# Patient Record
(demographics unavailable — no encounter records)

---

## 2024-12-02 NOTE — REASON FOR VISIT
[Home] : at home, [unfilled] , at the time of the visit. [Medical Office: ([facility])___] : at the medical office located in  [Family Member] : family member [Follow-Up] : a follow-up visit [FreeTextEntry3] : Daughter

## 2024-12-02 NOTE — REVIEW OF SYSTEMS
[TextEntry] : Review of Systems: general: No weight loss, No recent fever, chills HEENT: no headache, no change in vision or hearing Pulmonary: No SOB, or cough CVS: No chest pain, [place], or change in exercise tolerance Gastrointestinal: No Nausea/vomiting/constipation/diarrhea : No complaint of dysuria or urinary frequency, no excess foaming Skin: Denies no new rash, lesions, ulcer [place] skeletal: No edema, cyanosis, or new ulcers Neurological: No headache, dizziness, vertigo

## 2024-12-02 NOTE — RESULTS/DATA
[TextEntry] : Labs: 9.13.24 144/5.5/107/20/19/1.65/gfr41/ca9.9   05/22/24 144/4.9/104/28/121/1.58/egfr43 upcr 0.4 cbc 7.7/15.9/147  02/21/24 143/4.8/101/30/24/2.04 egfr 32 ca 9.5 UA protein 30 upcr 0.3  1/25/24 140/3.8/102/30/49/2.1 egfr 30.7 ca 9.7   11/7/23  cr2.35  11/3/23 141/4.7/93/34/71/2.38 egfr 26 ca 10.4  10/27/2023: Sodium 141, potassium 3.8, chloride 93, bicarb 36, BUN 70, creatinine 2.45 EGFR 25, calcium 10.2  10/16/2023 UA is negative for RBC negative for poor protein At that time creatinine level was 2.54  9/15/23 BUN/Cr 25/1.78 eGFR 37 K 5.1 HCO3 24 MRI with contrast   8/23/23: Left upper pole 7.6 x 6.8 cm exophytic cyst reveals various degrees of T1 hyperintensity and T2 hypointensity representing various stages of blood product, without internal enhancement to suggest active hemorrhage or extravasation based on subtracted postcontrast imaging, and no neural nodule or soft tissue component. Rest of multiple small simple cysts bilaterally, mostly sub-cm, several 1-2 cm. No suspicious or enhancing lesion. No hydronephrosis or hydroureter.      Labs 3/10/22  BUN/Cr 36/1.97 eGFR 31 K 5.2 HCO3 24

## 2024-12-02 NOTE — ASSESSMENT
[FreeTextEntry1] : Assessment: Hyperkalemia- due to CKD and dietary indiscretion  CKD stage 3B- Renal function unchanged HTN- Recently losartan was increased to 50 mg 2x a day by cardio, BP is elevated

## 2024-12-02 NOTE — HISTORY OF PRESENT ILLNESS
[FreeTextEntry1] : 12.2.24 Telehealth  Reason for visit: Time spent 15 min on the phone ,documentation and prescription CKD stage 3B Hyperkalemia HTN  PMH CKD stage 3B Hyperkalemia DM HTN CHF s/p AICD and pacemaker CAD  PSH AICD Pacemaker   Initial eval 4.1.2022 Past medical history of DM2, HTN, CHF s/p AICD and pacemaker, CAD was referred for evaluation of CKD.

## 2024-12-02 NOTE — PLAN
[TextEntry] : Plan: Hyperkalemia-Decrease Losartan 50 mg 1x a day CKD stage 3B- Losartan, Torsemide 20 mg changed to daily HTN- Increase amlodipine 10 mg daily and continue Losartan 50 mg Maintain physical activity  Low potassium diet and Low salt diet Follow up with recommended workups in 2 weeks

## 2025-04-23 NOTE — HISTORY OF PRESENT ILLNESS
[FreeTextEntry1] : 04/23/25 Telehealth  Time spent  Reason for visit Follow up for  CKD stage 3B Hyperkalemia HTN   12.2.24 Telehealth  Reason for visit: Time spent 15 min on the phone ,documentation and prescription CKD stage 3B Hyperkalemia HTN  PMH CKD stage 3B Hyperkalemia DM HTN CHF s/p AICD and pacemaker CAD  PSH AICD Pacemaker   Initial eval 4.1.2022 Past medical history of DM2, HTN, CHF s/p AICD and pacemaker, CAD was referred for evaluation of CKD.

## 2025-04-23 NOTE — PLAN
[TextEntry] : Plan: Hyperkalemia-Continue Losartan 50 mg 1x a day, Low K diet CKD stage 3B- Continue Losartan 50 mg, Torsemide 20 mg every other day, Increase Farxiga to 10 mg daily  HTN- Continue amlodipine 10 mg daily and continue Losartan 50 mg Proteinuria:  Increase Farxiga to 10 mg daily, Continue Losartan 50 mg daily, Low protein diet  Maintain physical activity  Follow up with recommended workups in 6-8 weeks

## 2025-04-23 NOTE — RESULTS/DATA
[TextEntry] : Labs: 4/18/25 142/5.3/106/25/17/1.53/gfr44/ca9.5 UPCR 0.8 HGB 17.1  9.13.24 144/5.5/107/20/19/1.65/gfr41/ca9.9   05/22/24 144/4.9/104/28/121/1.58/egfr43 upcr 0.4 cbc 7.7/15.9/147  02/21/24 143/4.8/101/30/24/2.04 egfr 32 ca 9.5 UA protein 30 upcr 0.3  1/25/24 140/3.8/102/30/49/2.1 egfr 30.7 ca 9.7   11/7/23  cr2.35  11/3/23 141/4.7/93/34/71/2.38 egfr 26 ca 10.4  10/27/2023: Sodium 141, potassium 3.8, chloride 93, bicarb 36, BUN 70, creatinine 2.45 EGFR 25, calcium 10.2  10/16/2023 UA is negative for RBC negative for poor protein At that time creatinine level was 2.54  9/15/23 BUN/Cr 25/1.78 eGFR 37 K 5.1 HCO3 24 MRI with contrast   8/23/23: Left upper pole 7.6 x 6.8 cm exophytic cyst reveals various degrees of T1 hyperintensity and T2 hypointensity representing various stages of blood product, without internal enhancement to suggest active hemorrhage or extravasation based on subtracted postcontrast imaging, and no neural nodule or soft tissue component. Rest of multiple small simple cysts bilaterally, mostly sub-cm, several 1-2 cm. No suspicious or enhancing lesion. No hydronephrosis or hydroureter.      Labs 3/10/22  BUN/Cr 36/1.97 eGFR 31 K 5.2 HCO3 24

## 2025-04-23 NOTE — REASON FOR VISIT
[Home] : at home, [unfilled] , at the time of the visit. [Medical Office: ([facility])___] : at the medical office located in  [Telephone (audio)] : This telephonic visit was provided via audio only technology. [Technical] : patient unable to effectively utilize tele-video due to technical issues. [Follow-Up] : a follow-up visit [Family Member] : family member

## 2025-04-23 NOTE — ASSESSMENT
[FreeTextEntry1] : Assessment  CKD stage 3B; Renal function slightly improving Proteinuria, slightly worsened Hyperkalemia, slightly improved

## 2025-06-13 NOTE — PHYSICAL EXAM
[TextEntry] : Physical Examination: General: Not in acute distress Head: Normocephalic, no lesions Eyes:EOMI, Fundi normal Throat: Clear, no exudates or lesions Chest: Lungs clear, no rales, no rhonchi, no wheezes CVS: S1/S2, RR, no murmurs, no rubs Abdomen: Soft NT/ND, +BS Extremities: 1+ edema  Neurological: Alert, awake, no gross focal deficits

## 2025-06-13 NOTE — REVIEW OF SYSTEMS
[TextEntry] : Review of Systems: general: No weight loss, No recent fever, chills HEENT: no headache, no change in vision or hearing Pulmonary: No SOB, or cough CVS: No chest pain, [place], or change in exercise tolerance Gastrointestinal: No Nausea/vomiting/constipation/diarrhea : No complaint of dysuria or urinary frequency, no excess foaming Skin: Denies no new rash, lesions, ulcer Mus[place] skeletal: No edema, cyanosis, or new ulcers Neurological: No headache, dizziness, vertigo No

## 2025-06-13 NOTE — RESULTS/DATA
[TextEntry] : Labs: 6/6/25 149/4.5/108/26/15/1.65/gfr40/ca9.7 UPCR 0.5 HGB 15.7  4/18/25 142/5.3/106/25/17/1.53/gfr44/ca9.5 UPCR 0.8 HGB 17.1  9.13.24 144/5.5/107/20/19/1.65/gfr41/ca9.9   05/22/24 144/4.9/104/28/121/1.58/egfr43 upcr 0.4 cbc 7.7/15.9/147  02/21/24 143/4.8/101/30/24/2.04 egfr 32 ca 9.5 UA protein 30 upcr 0.3  1/25/24 140/3.8/102/30/49/2.1 egfr 30.7 ca 9.7   11/7/23  cr2.35  11/3/23 141/4.7/93/34/71/2.38 egfr 26 ca 10.4  10/27/2023: Sodium 141, potassium 3.8, chloride 93, bicarb 36, BUN 70, creatinine 2.45 EGFR 25, calcium 10.2  10/16/2023 UA is negative for RBC negative for poor protein At that time creatinine level was 2.54  9/15/23 BUN/Cr 25/1.78 eGFR 37 K 5.1 HCO3 24 MRI with contrast   8/23/23: Left upper pole 7.6 x 6.8 cm exophytic cyst reveals various degrees of T1 hyperintensity and T2 hypointensity representing various stages of blood product, without internal enhancement to suggest active hemorrhage or extravasation based on subtracted postcontrast imaging, and no neural nodule or soft tissue component. Rest of multiple small simple cysts bilaterally, mostly sub-cm, several 1-2 cm. No suspicious or enhancing lesion. No hydronephrosis or hydroureter.      Labs 3/10/22  BUN/Cr 36/1.97 eGFR 31 K 5.2 HCO3 24

## 2025-06-13 NOTE — PLAN
[TextEntry] : Plan: CKD stage 3B- Continue Losartan 50 mg, Torsemide 20 mg daily, Farxiga 10 mg daily  HTN- Continue amlodipine 10 mg daily, Losartan 50 mg, Torsemide 20 mg daily (changed to daily from every other day 1 week ago) Proteinuria: Continue Farxiga 10 mg daily, Continue Losartan 50 mg daily, Low protein diet  Losartan dose cannot be increased due to Hx of Hyperkalemia Monitor his renal function, electrolytes and volume status with repeated workups  Maintain physical activity  Follow up with recommended workups in 4 months

## 2025-06-13 NOTE — ASSESSMENT
[FreeTextEntry1] : Assessment  CKD stage 3B; Renal function slightly improving HTN: 160/70, controlled  Proteinuria, slightly worsened Hyperkalemia resolved 1+ edema  Has a good appetite
Speaking Coherently

## 2025-06-13 NOTE — HISTORY OF PRESENT ILLNESS
[FreeTextEntry1] : 06/13/25 Reason for visit Follow up for  CKD stage 3B Hyperkalemia HTN  04/23/25 Telehealth  Time spent  Reason for visit Follow up for  CKD stage 3B Hyperkalemia HTN   12.2.24 Telehealth  Reason for visit: Time spent 15 min on the phone ,documentation and prescription CKD stage 3B Hyperkalemia HTN  PMH CKD stage 3B Hyperkalemia DM HTN CHF s/p AICD and pacemaker CAD  PSH AICD Pacemaker   Initial eval 4.1.2022 Past medical history of DM2, HTN, CHF s/p AICD and pacemaker, CAD was referred for evaluation of CKD.